# Patient Record
Sex: MALE | Race: OTHER | HISPANIC OR LATINO
[De-identification: names, ages, dates, MRNs, and addresses within clinical notes are randomized per-mention and may not be internally consistent; named-entity substitution may affect disease eponyms.]

---

## 2018-08-31 ENCOUNTER — TRANSCRIPTION ENCOUNTER (OUTPATIENT)
Age: 53
End: 2018-08-31

## 2021-04-18 ENCOUNTER — EMERGENCY (EMERGENCY)
Facility: HOSPITAL | Age: 56
LOS: 1 days | Discharge: HOME | End: 2021-04-18
Attending: EMERGENCY MEDICINE | Admitting: EMERGENCY MEDICINE
Payer: COMMERCIAL

## 2021-04-18 VITALS
HEART RATE: 63 BPM | TEMPERATURE: 98 F | WEIGHT: 149.91 LBS | OXYGEN SATURATION: 96 % | DIASTOLIC BLOOD PRESSURE: 96 MMHG | RESPIRATION RATE: 15 BRPM | HEIGHT: 66 IN | SYSTOLIC BLOOD PRESSURE: 166 MMHG

## 2021-04-18 VITALS
RESPIRATION RATE: 16 BRPM | SYSTOLIC BLOOD PRESSURE: 159 MMHG | OXYGEN SATURATION: 99 % | DIASTOLIC BLOOD PRESSURE: 97 MMHG | TEMPERATURE: 97 F | HEART RATE: 65 BPM

## 2021-04-18 LAB
ALBUMIN SERPL ELPH-MCNC: 4 G/DL — SIGNIFICANT CHANGE UP (ref 3.3–5)
ALP SERPL-CCNC: 121 U/L — HIGH (ref 30–120)
ALT FLD-CCNC: 42 U/L DA — SIGNIFICANT CHANGE UP (ref 10–60)
ANION GAP SERPL CALC-SCNC: 8 MMOL/L — SIGNIFICANT CHANGE UP (ref 5–17)
APTT BLD: 33 SEC — SIGNIFICANT CHANGE UP (ref 27.5–35.5)
AST SERPL-CCNC: 27 U/L — SIGNIFICANT CHANGE UP (ref 10–40)
BASOPHILS # BLD AUTO: 0.1 K/UL — SIGNIFICANT CHANGE UP (ref 0–0.2)
BASOPHILS NFR BLD AUTO: 1.3 % — SIGNIFICANT CHANGE UP (ref 0–2)
BILIRUB SERPL-MCNC: 0.3 MG/DL — SIGNIFICANT CHANGE UP (ref 0.2–1.2)
BUN SERPL-MCNC: 26 MG/DL — HIGH (ref 7–23)
CALCIUM SERPL-MCNC: 8.8 MG/DL — SIGNIFICANT CHANGE UP (ref 8.4–10.5)
CHLORIDE SERPL-SCNC: 103 MMOL/L — SIGNIFICANT CHANGE UP (ref 96–108)
CO2 SERPL-SCNC: 27 MMOL/L — SIGNIFICANT CHANGE UP (ref 22–31)
CREAT SERPL-MCNC: 1.08 MG/DL — SIGNIFICANT CHANGE UP (ref 0.5–1.3)
EOSINOPHIL # BLD AUTO: 0.46 K/UL — SIGNIFICANT CHANGE UP (ref 0–0.5)
EOSINOPHIL NFR BLD AUTO: 6.1 % — HIGH (ref 0–6)
GLUCOSE SERPL-MCNC: 134 MG/DL — HIGH (ref 70–99)
HCT VFR BLD CALC: 46.3 % — SIGNIFICANT CHANGE UP (ref 39–50)
HGB BLD-MCNC: 15.5 G/DL — SIGNIFICANT CHANGE UP (ref 13–17)
IMM GRANULOCYTES NFR BLD AUTO: 0.3 % — SIGNIFICANT CHANGE UP (ref 0–1.5)
INR BLD: 0.96 RATIO — SIGNIFICANT CHANGE UP (ref 0.88–1.16)
LYMPHOCYTES # BLD AUTO: 2.1 K/UL — SIGNIFICANT CHANGE UP (ref 1–3.3)
LYMPHOCYTES # BLD AUTO: 27.9 % — SIGNIFICANT CHANGE UP (ref 13–44)
MCHC RBC-ENTMCNC: 27.9 PG — SIGNIFICANT CHANGE UP (ref 27–34)
MCHC RBC-ENTMCNC: 33.5 GM/DL — SIGNIFICANT CHANGE UP (ref 32–36)
MCV RBC AUTO: 83.3 FL — SIGNIFICANT CHANGE UP (ref 80–100)
MONOCYTES # BLD AUTO: 0.55 K/UL — SIGNIFICANT CHANGE UP (ref 0–0.9)
MONOCYTES NFR BLD AUTO: 7.3 % — SIGNIFICANT CHANGE UP (ref 2–14)
NEUTROPHILS # BLD AUTO: 4.3 K/UL — SIGNIFICANT CHANGE UP (ref 1.8–7.4)
NEUTROPHILS NFR BLD AUTO: 57.1 % — SIGNIFICANT CHANGE UP (ref 43–77)
NRBC # BLD: 0 /100 WBCS — SIGNIFICANT CHANGE UP (ref 0–0)
PLATELET # BLD AUTO: 264 K/UL — SIGNIFICANT CHANGE UP (ref 150–400)
POTASSIUM SERPL-MCNC: 3.8 MMOL/L — SIGNIFICANT CHANGE UP (ref 3.5–5.3)
POTASSIUM SERPL-SCNC: 3.8 MMOL/L — SIGNIFICANT CHANGE UP (ref 3.5–5.3)
PROT SERPL-MCNC: 7.4 G/DL — SIGNIFICANT CHANGE UP (ref 6–8.3)
PROTHROM AB SERPL-ACNC: 11.6 SEC — SIGNIFICANT CHANGE UP (ref 10.6–13.6)
RBC # BLD: 5.56 M/UL — SIGNIFICANT CHANGE UP (ref 4.2–5.8)
RBC # FLD: 13.2 % — SIGNIFICANT CHANGE UP (ref 10.3–14.5)
SODIUM SERPL-SCNC: 138 MMOL/L — SIGNIFICANT CHANGE UP (ref 135–145)
TROPONIN I SERPL-MCNC: 0 NG/ML — LOW (ref 0.02–0.06)
WBC # BLD: 7.53 K/UL — SIGNIFICANT CHANGE UP (ref 3.8–10.5)
WBC # FLD AUTO: 7.53 K/UL — SIGNIFICANT CHANGE UP (ref 3.8–10.5)

## 2021-04-18 PROCEDURE — 84484 ASSAY OF TROPONIN QUANT: CPT

## 2021-04-18 PROCEDURE — 70496 CT ANGIOGRAPHY HEAD: CPT | Mod: 26,MA

## 2021-04-18 PROCEDURE — 93005 ELECTROCARDIOGRAM TRACING: CPT

## 2021-04-18 PROCEDURE — 70498 CT ANGIOGRAPHY NECK: CPT

## 2021-04-18 PROCEDURE — 82962 GLUCOSE BLOOD TEST: CPT

## 2021-04-18 PROCEDURE — 80053 COMPREHEN METABOLIC PANEL: CPT

## 2021-04-18 PROCEDURE — 36415 COLL VENOUS BLD VENIPUNCTURE: CPT

## 2021-04-18 PROCEDURE — 93010 ELECTROCARDIOGRAM REPORT: CPT

## 2021-04-18 PROCEDURE — 99284 EMERGENCY DEPT VISIT MOD MDM: CPT | Mod: 25

## 2021-04-18 PROCEDURE — 85730 THROMBOPLASTIN TIME PARTIAL: CPT

## 2021-04-18 PROCEDURE — 85610 PROTHROMBIN TIME: CPT

## 2021-04-18 PROCEDURE — 70450 CT HEAD/BRAIN W/O DYE: CPT

## 2021-04-18 PROCEDURE — 85025 COMPLETE CBC W/AUTO DIFF WBC: CPT

## 2021-04-18 PROCEDURE — 70496 CT ANGIOGRAPHY HEAD: CPT

## 2021-04-18 PROCEDURE — 70498 CT ANGIOGRAPHY NECK: CPT | Mod: 26,MA

## 2021-04-18 PROCEDURE — 99285 EMERGENCY DEPT VISIT HI MDM: CPT

## 2021-04-18 RX ORDER — MECLIZINE HCL 12.5 MG
1 TABLET ORAL
Qty: 20 | Refills: 0
Start: 2021-04-18

## 2021-04-18 NOTE — ED PROVIDER NOTE - CARE PROVIDER_API CALL
Natanael Amin)  Otolaryngology  [address], [address]  [place] [address]  Phone: [phone]  Fax: [phone]  Follow Up Time: 1-3 Days    Danny Concepcion  NEUROLOGY  [address] [place] [address]  Phone: [phone]  Fax: [phone]  Follow Up Time: 1-3 Days

## 2021-04-18 NOTE — ED PROVIDER NOTE - OBJECTIVE STATEMENT
55 y.o. M c/o spinning/unsteady feeling, after dinner felt off, laid down, got up to use restroom and was spinning, had to hold the walls, episode passed, now feels better, no cp, no sob, no change in vision, no ha, no change in speech, no focal weakness, no paresthesias, never had this before, no recent illness, no covid, no ear complaints 55 y.o. M c/o spinning/unsteady feeling, after dinner ([id number]) felt off, laid down, got up to use restroom and was spinning, had to hold the walls, episode passed, now feels better, no cp, no sob, no change in vision, no ha, no change in speech, no focal weakness, no paresthesias, never had this before, no recent illness, no covid, no ear complaints

## 2021-04-18 NOTE — ED PROVIDER NOTE - CLINICAL SUMMARY MEDICAL DECISION MAKING FREE TEXT BOX
episode of vertigo 45min PTA, resolved, by history likely peripheral, will check labs, ct/cta brain, reassess

## 2021-04-18 NOTE — ED ADULT NURSE NOTE - NSIMPLEMENTINTERV_GEN_ALL_ED
Implemented All Universal Safety Interventions:  [place] to call system. Call bell, personal items and telephone within reach. Instruct patient to call for assistance. Room bathroom lighting operational. Non-slip footwear when patient is off stretcher. Physically safe environment: no spills, clutter or unnecessary equipment. Stretcher in lowest position, wheels locked, appropriate side rails in place.

## 2021-04-18 NOTE — ED PROVIDER NOTE - NSFOLLOWUPINSTRUCTIONS_ED_ALL_ED_FT
Benign Paroxysmal Positional Vertigo    WHAT YOU NEED TO KNOW:    BPPV is an inner ear condition that causes you to suddenly feel dizzy. Benign means it is not serious or life-threatening. BPPV is caused by a problem with the nerves and structure of your inner ear. BPPV happens when small pieces of calcium break loose and lump together in one of your inner ear canals.     Ear Anatomy         DISCHARGE INSTRUCTIONS:    Return to the emergency department if:   •You fall during a BPPV episode and are injured.      •You have a severe headache that does not go away.      •You have new changes in your vision or feel weak or confused.      •You have problems hearing, or you have ringing or buzzing in your ears.      Contact your healthcare provider if:   •Your BPPV symptoms do not go away or they return.      •You have problems with your balance, or you are falling often.      •You have new or increased nausea or vomiting with vertigo.      •You feel anxious or depressed and do not want to leave your home.      •You have questions or concerns about your condition or care.      Medicines:   •Medicines may be recommended or prescribed to treat dizziness or nausea.      •Take your medicine as directed. Contact your healthcare provider if you think your medicine is not helping or if you have side effects. Tell him of her if you are allergic to any medicine. Keep a list of the medicines, vitamins, and herbs you take. Include the amounts, and when and why you take them. Bring the list or the pill bottles to follow-up visits. Carry your medicine list with you in case of an emergency.      Prevent your symptoms:   •Try to avoid sudden head movements. Stand up and lie down slowly.       •Raise and support your head when you lie down. Place pillows under your upper back and head or rest in a recliner.       •Change your position often when you are lying down. Try not to lie with your head on the same side for long periods of time. Roll over slowly.       •Wear protective gear when you ride a bike or play sports. A helmet helps protect your head from injury.      Follow up with your healthcare provider as directed: You may need to return in 1 month to check the progress of your treatment. Write down your questions so you remember to ask them during your visits.

## 2021-04-18 NOTE — ED PROVIDER NOTE - PROGRESS NOTE DETAILS
continues to be asymptomatic, appears c/w BPPV, will send rx for meclizine, discussed s/s to return to ED for, will provide copy testing, will provide referral for neuro and ENT

## 2021-04-18 NOTE — ED ADULT TRIAGE NOTE - BP NONINVASIVE DIASTOLIC (MM HG)
Thoracic Spine Strain  The thoracic spine is the middle part of the back between the neck and the lower back. A thoracic spine strain is due to stretching and tearing of the muscle fibers that support the spine. This may happen because of severe coughing or heavy lifting. Or it may be caused by twisting injuries of the upper back, such as from a fall or a car or bike accident.       This often causes increased pain when you move or breathe deeply. This may take 3 to 6 weeks to heal.  Home care  · Rest. Avoid heavy lifting or hard work. Avoid any activity that causes pain.  · You may find relief with heat (hot shower, hot bath or heating pad) and massage. Or you may prefer cold packs. Try both and use the method that feels best for 20 minutes several times a day. To make an ice pack, put ice cubes in a plastic bag that seals at the top. Wrap the bag in a clean, thin towel or cloth. Never put ice or an ice pack directly on your skin.  · If you have a severe cough, use an over-the-counter cough medicine unless another cough medicine was prescribed.  · You may use over-the-counter pain medicine to control pain, unless another medicine was prescribed. Talk with your provider before using these medicines if you have chronic liver or kidney disease or ever had a stomach ulcer or GI bleeding.  Follow-up care  Follow up with your healthcare provider, or as directed.  When to seek medical advice  Call your healthcare provider right away if you have:  · A change in the type of pain: if it feels different, becomes more severe, lasts longer, or begins to spread into your shoulder, arm, neck, jaw or back  Call 911  Call 911 if you have:  · Shortness of breath or increased pain with breathing  · Cough with dark colored sputum (phlegm) or blood  · Weakness, dizziness, or fainting  · Numbness or weakness in one or both legs or arms  © 2077-8990 [employer]. [address] [place] [address], [place] [address]. All rights  reserved. This information is not intended as a substitute for professional medical care. Always follow your healthcare professional's instructions.         96

## 2021-04-18 NOTE — ED ADULT NURSE NOTE - OBJECTIVE STATEMENT
54yo male walked into ED, pt c/o dizziness X1 hour prior to ED arrival. pt denies dizziness on arrival of ED. pt denies pain/distress. pt has a steady gait. pt denies earache or pain. No facial droop, facial slur noted. pt has strength to X4 extremes.

## 2021-04-18 NOTE — ED PROVIDER NOTE - PATIENT PORTAL LINK FT
You can access the FollowMyHealth Patient Portal offered by [facility] by registering at the following website: [email or web address]. By joining [employer]’s FollowMyHealth portal, you will also be able to view your health information using other applications (apps) compatible with our system.

## 2021-04-18 NOTE — ED PROVIDER NOTE - PROVIDER TOKENS
PROVIDER:[TOKEN:[[id number]],FOLLOWUP:[1-3 Days]],PROVIDER:[TOKEN:[[id number]],FOLLOWUP:[1-3 Days]]

## 2021-04-28 ENCOUNTER — APPOINTMENT (OUTPATIENT)
Dept: OTOLARYNGOLOGY | Facility: CLINIC | Age: 56
End: 2021-04-28

## 2021-10-09 ENCOUNTER — APPOINTMENT (OUTPATIENT)
Dept: DISASTER EMERGENCY | Facility: OTHER | Age: 56
End: 2021-10-09
Payer: COMMERCIAL

## 2021-10-09 PROCEDURE — 0011A: CPT

## 2021-11-06 ENCOUNTER — APPOINTMENT (OUTPATIENT)
Dept: DISASTER EMERGENCY | Facility: OTHER | Age: 56
End: 2021-11-06
Payer: COMMERCIAL

## 2021-11-06 PROCEDURE — 0012A: CPT

## 2022-06-15 ENCOUNTER — EMERGENCY (EMERGENCY)
Facility: HOSPITAL | Age: 57
LOS: 1 days | Discharge: HOME | End: 2022-06-15
Attending: EMERGENCY MEDICINE | Admitting: EMERGENCY MEDICINE
Payer: COMMERCIAL

## 2022-06-15 VITALS
HEIGHT: 66 IN | DIASTOLIC BLOOD PRESSURE: 90 MMHG | HEART RATE: 75 BPM | OXYGEN SATURATION: 97 % | RESPIRATION RATE: 18 BRPM | WEIGHT: 152.34 LBS | TEMPERATURE: 98 F | SYSTOLIC BLOOD PRESSURE: 148 MMHG

## 2022-06-15 PROCEDURE — 12001 RPR S/N/AX/GEN/TRNK 2.5CM/<: CPT

## 2022-06-15 PROCEDURE — 99283 EMERGENCY DEPT VISIT LOW MDM: CPT | Mod: 25

## 2022-06-15 PROCEDURE — 90471 IMMUNIZATION ADMIN: CPT | Mod: XU

## 2022-06-15 PROCEDURE — 90715 TDAP VACCINE 7 YRS/> IM: CPT

## 2022-06-15 RX ORDER — TETANUS TOXOID, REDUCED DIPHTHERIA TOXOID AND ACELLULAR PERTUSSIS VACCINE, ADSORBED 5; 2.5; 8; 8; 2.5 [IU]/.5ML; [IU]/.5ML; UG/.5ML; UG/.5ML; UG/.5ML
0.5 SUSPENSION INTRAMUSCULAR ONCE
Refills: 0 | Status: COMPLETED | OUTPATIENT
Start: 2022-06-15 | End: 2022-06-15

## 2022-06-15 RX ADMIN — TETANUS TOXOID, REDUCED DIPHTHERIA TOXOID AND ACELLULAR PERTUSSIS VACCINE, ADSORBED 0.5 MILLILITER(S): 5; 2.5; 8; 8; 2.5 SUSPENSION INTRAMUSCULAR at 21:40

## 2022-06-15 NOTE — ED PROVIDER NOTE - NS ED ATTENDING STATEMENT MOD
This was a shared visit with the ANDRÉS. I reviewed and verified the documentation and independently performed the documented:

## 2022-06-15 NOTE — ED PROVIDER NOTE - OBJECTIVE STATEMENT
56 year old male presents with superficial lac/puncture wound to right hand at home PTA. was washing dished, dropped a knife and punctured right palm. no bony tenderness. no n/t. bleeding controlled. no weakness. right handed. unsure of tetanus status. denies other injuries or complaints   PCP Nish Harrell

## 2022-06-15 NOTE — ED PROVIDER NOTE - ATTENDING APP SHARED VISIT CONTRIBUTION OF CARE
57yo male with puncture wound to right hand with a knift, no tingling or weakness  exam:+0.5cm puncture wound to right thenar eminence, neurovasc intact  plan: wound care, steri strips, tdap  agree with assessment and plan of pA

## 2022-06-15 NOTE — ED PROVIDER NOTE - PATIENT PORTAL LINK FT
You can access the FollowMyHealth Patient Portal offered by [facility] by registering at the following website: [email or web address]. By joining [employer]’s FollowMyHealth portal, you will also be able to view your health information using other applications (apps) compatible with our system.

## 2022-06-15 NOTE — ED PROVIDER NOTE - CARE PROVIDER_API CALL
Nish Harrell  [facility]  [address], [address]  [place] [address]  Phone: [phone]  Fax: [phone]  Follow Up Time: 1-3 Days

## 2022-06-15 NOTE — ED ADULT NURSE NOTE - OBJECTIVE STATEMENT
Pt presents to the ED with a small superficial laceration to his right dorsal palm. Pt states he was washing a knife when is slipped out of his hand and punctured him in the other hand. No bleeding at this time, no swelling, redness or discharge, dull pain at site reported.

## 2022-06-15 NOTE — ED PROVIDER NOTE - NSFOLLOWUPINSTRUCTIONS_ED_ALL_ED_FT
keep steri strip on until they fall off  keep clean and dry 24 hours      Nonsutured Laceration Care      A laceration is a cut that may go through all layers of the skin and into the tissue that is right under the skin.    A laceration is usually stitched up (sutured) or closed with adhesive strips or skin glue shortly after the injury happens. However, if the wound is dirty or if several hours pass before medical treatment is provided, it is likely that bacteria will enter the wound. Closing a laceration after bacteria have entered it increases the risk for infection. In these cases, your health care provider may leave the laceration open (nonsutured) and cover it with a bandage (dressing). This type of treatment helps prevent infection and allows the wound to heal from the deepest layer of tissue damage up to the surface.    Nonsutured healing is also known as secondary wound healing. This is more common for wounds that involve loss of tissue, are irregular in shape and size, or are on surfaces of the body where movement makes sutures or other closure methods impossible.      How to care for your nonsutured laceration  Two open wounds. One is normal. The other is red with pus and infected.   Follow instructions from your health care provider about how to take care of your wound.  •Keep the wound clean and dry.      •Change any dressings as told by your health care provider. This includes changing the dressing when it starts to smell, or when it gets wet or dirty.    •Clean the wound one time each day, or as often as told by your health care provider. To clean your wound:  1.Wash your hands with soap and water for at least 20 seconds before and after touching your wound or changing your dressing. If soap and water are not available, use hand .      2.Remove any dressing as told by your health care provider.      3.Clean the wound with water or irrigation solution as told by your health care provider.      4.Pat the wound dry with a clean towel. Do not rub the wound.      5.Apply a thin layer of antibiotic ointment or another topical ointment to the wound as told by your health care provider. This will prevent infection and keep the dressing from sticking to the wound.      6.Apply a new dressing as told by your health care provider.      •Check your wound every day for signs of infection. Watch for:  •More redness, swelling, or pain.      •Fluid or blood.      •Warmth.      •Pus or a bad smell.        •Do not take baths, swim, or do anything that puts your wound underwater until your health care provider approves.      •Do not scratch or pick at the wound.      •Do not usedisinfectants or antiseptics, such as rubbing alcohol, to clean your wound unless told by your health care provider.        Follow these instructions at home:    Medicines     •Take over-the-counter and prescription medicines only as told by your health care provider.      •If you were prescribed an antibiotic medicine, take or apply it as told by your health care provider. Do not stop using the antibiotic even if your condition improves.      Managing pain and swelling   •If directed, put ice on the injured area. To do this:  •Put ice in a plastic bag.      •Place a towel between your skin and the bag.      •Leave the ice on for 20 minutes, 2–3 times a day.      •Remove the ice if your skin turns bright red. This is very important. If you cannot feel pain, heat, or cold, you have a greater risk of damage to the area.        •Raise (elevate) the injured area above the level of your heart while you are sitting or lying down.      General instructions     •Avoid any activity that could cause your laceration to reopen.      •Keep all follow-up visits. This is important.        Contact a health care provider if:    •You received a tetanus shot and you have swelling, severe pain, redness, or bleeding at the injection site.      •Your pain is not controlled with medicine.    •You have any of these signs of infection:  •More redness, swelling, or pain around your wound.      •Fluid or blood coming from your wound.      •Warmth coming from your wound.      •Pus or a bad smell coming from your wound.      •A fever.        •You notice something coming out of the wound, such as wood or glass.      •You notice a change in the color of your skin near your wound.      •You develop a new rash.      •You need to change the dressing often.      •You develop numbness around your wound.        Get help right away if:    •Your pain suddenly increases and is severe.      •You develop severe swelling around the wound.      •The wound is on your hand or foot, and you cannot properly move a finger or toe.      •The wound is on your hand or foot, and you notice that your fingers or toes look pale or bluish.      •You have a red streak going away from your wound.      •You develop painful lumps near the wound or on skin anywhere else on your body.        Summary    •A laceration is a cut that may go through all layers of the skin and into the tissue that is right under the skin. It is usually closed with stitches, tape, or skin glue shortly after the injury happens.      •If a wound is dirty or if several hours pass before medical treatment is provided, the laceration may be kept open (nonsutured) and covered with a bandage.      •Nonsutured laceration helps prevent infection and allows the wound to heal from the deepest layer of tissue damage up to the surface.      •Follow instructions from your health care provider about how to take care of your wound.      This information is not intended to replace advice given to you by your health care provider. Make sure you discuss any questions you have with your health care provider.

## 2022-06-15 NOTE — ED PROVIDER NOTE - PROGRESS NOTE DETAILS
lac superficial in nature. cleaned, steri strip, and bandage applied. wound care and risk of infection discussed. tetanus updated.  An opportunity to ask questions was given.  Discussed the importance of prompt, close medical follow-up.  Patient will return with any changes, concerns or persistent / worsening symptoms.  Understanding of all instructions verbalized.
